# Patient Record
Sex: MALE | Race: WHITE | NOT HISPANIC OR LATINO | Employment: OTHER | URBAN - METROPOLITAN AREA
[De-identification: names, ages, dates, MRNs, and addresses within clinical notes are randomized per-mention and may not be internally consistent; named-entity substitution may affect disease eponyms.]

---

## 2025-08-07 ENCOUNTER — HOSPITAL ENCOUNTER (EMERGENCY)
Facility: HOSPITAL | Age: 75
Discharge: HOME | End: 2025-08-07
Payer: MEDICARE

## 2025-08-07 VITALS
WEIGHT: 180 LBS | SYSTOLIC BLOOD PRESSURE: 113 MMHG | OXYGEN SATURATION: 96 % | TEMPERATURE: 97.7 F | BODY MASS INDEX: 24.38 KG/M2 | DIASTOLIC BLOOD PRESSURE: 82 MMHG | RESPIRATION RATE: 16 BRPM | HEART RATE: 85 BPM | HEIGHT: 72 IN

## 2025-08-07 DIAGNOSIS — D18.01 CHERRY HEMANGIOMA: Primary | ICD-10-CM

## 2025-08-07 PROCEDURE — 99281 EMR DPT VST MAYX REQ PHY/QHP: CPT

## 2025-08-07 NOTE — ED PROVIDER NOTES
Limitations to History: None     HPI:      Jesus Escalera is a 75 y.o. male with significant PMH for hypertension, Parkinson disease with internal brain stimulator, and BPH presenting to ED today from home with wife for evaluation of a rash.  Caregiver who pays the patient weekly noticed an erythematous rash over the torso and upper extremities today, wife was advised to bring the patient to the ER for further evaluation.  Patient is acting at baseline with his advanced Parkinson's disease.  Wife denies fever/chills, cough/cold symptoms, difficulty breathing, nausea/vomiting, abdominal pain, urinary symptoms, change in bowel habits or any other complaints.  No smoking, EtOH or IV drugs.  [facility]    Additional History Obtained from: Triage/nursing notes reviewed    ------------------------------------------------------------------------------------------------------------------------------------------    VS: As documented in the triage note and EMR flowsheet from this visit were reviewed.    Physical Exam:  Gen: 75-year-old male, sitting in a wheelchair.  Awake and alert, responding at baseline.  Thin with muscle wasting.  Well-hydrated.  Nontoxic looking  Head/Neck: NCAT, neck w/ FROM  Eyes: EOMI, PERRL, anicteric sclerae, noninjected conjunctivae  Ears: TMs clear b/l without sign of infection  Nose: Nares patent w/o rhinorrhea  Mouth:  MMM, no OP lesions noted  Heart: RRR no MRG  Lungs: CTA b/l no RRW, no increased work of breathing  Abdomen: soft, NT, ND, no HSM, no palpable masses  Musculoskeletal: QUANG x 4.  MSPs intact.  Skin intact.  No deformities  Neurologic: Alert, symmetrical facies, phonates clearly, moves all extremities equally, responsive to touch, ambulates normally   Skin: Erythematous nonblanching dome-shaped bumps, reddish-purple scattered over the the torso and upper extremities.  Pink, warm and  dry.        ------------------------------------------------------------------------------------------------------------------------------------------    Medical Decision Makin y.o. male with significant PMH for hypertension, Parkinson disease with internal brain stimulator, and BPH he is evaluated at the bedside for an rash.  On arrival vital signs within normal limits.  Afebrile.  Patient is behaving at baseline.  Erythematous nonblanching dome shaped reddish-purple bumps are consistent with cherry hemangiomas, a noncancerous benign skin condition, no treatment is necessary.  Discharge home.  Follow-up with PCP as needed.  Resume normal medications.  Stay well-hydrated.  Return precautions red flags discussed.  All questions were entertained and answered.  Shared decision making conducted.  Wife verbalizes understanding of diagnosis and treatment plan.  Condition stable for discharge    Diagnoses as of 25 1249   Cherry hemangioma       EKG interpreted by myself (ED attending physician): Not ordered    Chronic Medical Conditions Significantly Affecting Care: None identified    External Records Reviewed: I reviewed recent and relevant outside records including: None    Discussion of Management with Other Providers: None    I discussed the patient/results with: None       FREDRICK Velazquez-CNP  25 [id number]

## 2025-08-07 NOTE — DISCHARGE INSTRUCTIONS
You have a benign skin condition called cherry hemangiomas.  There is no treatment.  This is a noncancerous skin condition.  Follow-up with your PCP as needed.  Resume normal medications.  Return if symptoms worsen.

## 2025-08-07 NOTE — ED TRIAGE NOTES
Pt coming in for rash that is all over his body. States that they started yesterday. Appears to be bright red circular dots on his body. States that sometimes they itch sometimes they dont. Some on his upper thighs, arms and his torso